# Patient Record
Sex: FEMALE | Race: WHITE | NOT HISPANIC OR LATINO | Employment: FULL TIME | URBAN - METROPOLITAN AREA
[De-identification: names, ages, dates, MRNs, and addresses within clinical notes are randomized per-mention and may not be internally consistent; named-entity substitution may affect disease eponyms.]

---

## 2017-05-05 ENCOUNTER — TRANSCRIBE ORDERS (OUTPATIENT)
Dept: ADMINISTRATIVE | Facility: HOSPITAL | Age: 42
End: 2017-05-05

## 2017-05-05 DIAGNOSIS — Z12.31 VISIT FOR SCREENING MAMMOGRAM: Primary | ICD-10-CM

## 2017-06-09 ENCOUNTER — HOSPITAL ENCOUNTER (OUTPATIENT)
Dept: MAMMOGRAPHY | Facility: HOSPITAL | Age: 42
Discharge: HOME | End: 2017-06-09
Attending: OBSTETRICS & GYNECOLOGY | Admitting: OBSTETRICS & GYNECOLOGY

## 2017-06-09 DIAGNOSIS — Z12.31 VISIT FOR SCREENING MAMMOGRAM: ICD-10-CM

## 2017-06-09 PROCEDURE — 77063 BREAST TOMOSYNTHESIS BI: CPT

## 2017-06-09 PROCEDURE — 77067 SCR MAMMO BI INCL CAD: CPT | Performed by: RADIOLOGY

## 2017-06-09 PROCEDURE — G0202 SCR MAMMO BI INCL CAD: HCPCS

## 2017-06-09 PROCEDURE — 77063 BREAST TOMOSYNTHESIS BI: CPT | Performed by: RADIOLOGY

## 2018-07-20 ENCOUNTER — TRANSCRIBE ORDERS (OUTPATIENT)
Dept: ADMINISTRATIVE | Facility: HOSPITAL | Age: 43
End: 2018-07-20

## 2018-07-20 DIAGNOSIS — Z12.31 VISIT FOR SCREENING MAMMOGRAM: Primary | ICD-10-CM

## 2018-08-24 ENCOUNTER — APPOINTMENT (OUTPATIENT)
Dept: MAMMOGRAPHY | Facility: HOSPITAL | Age: 43
End: 2018-08-24
Attending: OBSTETRICS & GYNECOLOGY

## 2018-10-02 ENCOUNTER — HOSPITAL ENCOUNTER (OUTPATIENT)
Dept: MAMMOGRAPHY | Facility: HOSPITAL | Age: 43
Discharge: HOME | End: 2018-10-02
Attending: OBSTETRICS & GYNECOLOGY | Admitting: OBSTETRICS & GYNECOLOGY

## 2018-10-02 DIAGNOSIS — Z12.31 VISIT FOR SCREENING MAMMOGRAM: ICD-10-CM

## 2018-10-02 PROCEDURE — 77063 BREAST TOMOSYNTHESIS BI: CPT | Performed by: RADIOLOGY

## 2018-10-02 PROCEDURE — 77063 BREAST TOMOSYNTHESIS BI: CPT

## 2018-10-02 PROCEDURE — 77067 SCR MAMMO BI INCL CAD: CPT | Performed by: RADIOLOGY

## 2018-10-02 PROCEDURE — 77067 SCR MAMMO BI INCL CAD: CPT

## 2018-10-18 ENCOUNTER — HOSPITAL ENCOUNTER (OUTPATIENT)
Dept: MAMMOGRAPHY | Facility: HOSPITAL | Age: 43
Discharge: HOME | End: 2018-10-18
Admitting: RADIOLOGY

## 2018-10-18 DIAGNOSIS — R92.8 ABNORMAL MAMMOGRAM: ICD-10-CM

## 2018-10-18 PROCEDURE — 77065 DX MAMMO INCL CAD UNI: CPT

## 2018-10-18 PROCEDURE — G0279 TOMOSYNTHESIS, MAMMO: HCPCS

## 2018-10-18 PROCEDURE — 77061 BREAST TOMOSYNTHESIS UNI: CPT | Performed by: RADIOLOGY

## 2018-10-18 PROCEDURE — 77065 DX MAMMO INCL CAD UNI: CPT | Performed by: RADIOLOGY

## 2019-10-23 ENCOUNTER — TRANSCRIBE ORDERS (OUTPATIENT)
Dept: ADMINISTRATIVE | Facility: HOSPITAL | Age: 44
End: 2019-10-23

## 2019-10-23 DIAGNOSIS — Z12.31 VISIT FOR SCREENING MAMMOGRAM: Primary | ICD-10-CM

## 2020-04-21 ENCOUNTER — APPOINTMENT (OUTPATIENT)
Dept: MAMMOGRAPHY | Facility: HOSPITAL | Age: 45
End: 2020-04-21

## 2020-07-09 ENCOUNTER — APPOINTMENT (OUTPATIENT)
Dept: MAMMOGRAPHY | Facility: HOSPITAL | Age: 45
End: 2020-07-09

## 2020-10-16 ENCOUNTER — HOSPITAL ENCOUNTER (OUTPATIENT)
Dept: MAMMOGRAPHY | Facility: HOSPITAL | Age: 45
Discharge: HOME | End: 2020-10-16
Admitting: OBSTETRICS & GYNECOLOGY

## 2020-10-16 DIAGNOSIS — Z12.31 VISIT FOR SCREENING MAMMOGRAM: ICD-10-CM

## 2020-10-16 PROCEDURE — 77067 SCR MAMMO BI INCL CAD: CPT

## 2020-10-16 PROCEDURE — 77067 SCR MAMMO BI INCL CAD: CPT | Performed by: RADIOLOGY

## 2020-10-16 PROCEDURE — 77063 BREAST TOMOSYNTHESIS BI: CPT | Performed by: RADIOLOGY

## 2020-10-16 PROCEDURE — 77063 BREAST TOMOSYNTHESIS BI: CPT

## 2020-12-16 ENCOUNTER — OFFICE VISIT (OUTPATIENT)
Dept: OBSTETRICS AND GYNECOLOGY | Facility: CLINIC | Age: 45
End: 2020-12-16

## 2020-12-16 VITALS
BODY MASS INDEX: 30.3 KG/M2 | HEIGHT: 63 IN | SYSTOLIC BLOOD PRESSURE: 118 MMHG | DIASTOLIC BLOOD PRESSURE: 76 MMHG | WEIGHT: 171 LBS

## 2020-12-16 DIAGNOSIS — R23.2 HOT FLASHES: ICD-10-CM

## 2020-12-16 DIAGNOSIS — Z01.419 WOMEN'S ANNUAL ROUTINE GYNECOLOGICAL EXAMINATION: Primary | ICD-10-CM

## 2020-12-16 DIAGNOSIS — Z12.39 ENCOUNTER FOR BREAST CANCER SCREENING USING NON-MAMMOGRAM MODALITY: ICD-10-CM

## 2020-12-16 DIAGNOSIS — Z30.431 ENCOUNTER FOR ROUTINE CHECKING OF INTRAUTERINE CONTRACEPTIVE DEVICE (IUD): ICD-10-CM

## 2020-12-16 PROCEDURE — 99396 PREV VISIT EST AGE 40-64: CPT | Performed by: OBSTETRICS & GYNECOLOGY

## 2020-12-16 RX ORDER — SPIRONOLACTONE 25 MG/1
TABLET ORAL
COMMUNITY

## 2020-12-16 RX ORDER — OLOPATADINE HYDROCHLORIDE 2 MG/ML
SOLUTION/ DROPS OPHTHALMIC
COMMUNITY
Start: 2020-11-09

## 2020-12-16 RX ORDER — FUROSEMIDE 20 MG/1
TABLET ORAL
COMMUNITY
Start: 2020-11-09

## 2020-12-16 RX ORDER — LIFITEGRAST 50 MG/ML
SOLUTION/ DROPS OPHTHALMIC
COMMUNITY
Start: 2020-11-11

## 2020-12-16 RX ORDER — DICLOFENAC SODIUM 75 MG/1
TABLET, DELAYED RELEASE ORAL
COMMUNITY
Start: 2020-12-02

## 2020-12-16 RX ORDER — RIZATRIPTAN BENZOATE 10 MG/1
TABLET, ORALLY DISINTEGRATING ORAL
COMMUNITY
Start: 2020-11-11

## 2020-12-16 RX ORDER — PLECANATIDE 3 MG/1
TABLET ORAL
COMMUNITY
Start: 2020-12-02

## 2020-12-16 RX ORDER — PHENTERMINE HYDROCHLORIDE 37.5 MG/1
37.5 CAPSULE ORAL DAILY
COMMUNITY
Start: 2020-11-09 | End: 2023-03-23

## 2020-12-16 RX ORDER — PANTOPRAZOLE SODIUM 40 MG/1
TABLET, DELAYED RELEASE ORAL
COMMUNITY
Start: 2020-12-01

## 2020-12-16 RX ORDER — ALPRAZOLAM 0.5 MG/1
TABLET ORAL
COMMUNITY
Start: 2020-11-09

## 2020-12-16 RX ORDER — VALACYCLOVIR HYDROCHLORIDE 500 MG/1
TABLET, FILM COATED ORAL
COMMUNITY
Start: 2020-11-09

## 2020-12-16 RX ORDER — AZATHIOPRINE 50 MG/1
TABLET ORAL
COMMUNITY
Start: 2020-11-10

## 2020-12-16 RX ORDER — BUPROPION HYDROCHLORIDE 150 MG/1
TABLET, EXTENDED RELEASE ORAL
COMMUNITY
Start: 2020-10-13

## 2020-12-16 RX ORDER — VENLAFAXINE HYDROCHLORIDE 37.5 MG/1
CAPSULE, EXTENDED RELEASE ORAL
COMMUNITY
Start: 2020-11-23

## 2020-12-16 NOTE — PROGRESS NOTES
GYN Annual Exam     CC - Here for annual exam.     Subjective   HPI  Jazmine Rubalcava is a 45 y.o. female, , who presents for annual well woman exam. No LMP recorded. Patient has had an implant..  Periods are absent secondary to birth control, lasting 0 days.  Dysmenorrhea:none.  Patient reports problems with: none.  Partner Status: Marital Status: .  New Partners since last visit: no.  Desires STD Screening: no.  Patient has not had the hpv vaccine.    Patient asking to discuss nexplanon.     Additional OB/GYN History     Current contraception: contraceptive methods: IUD.  Insertion date: 2016  Desires to: continue contraception  Last Pap :   Last Completed Pap Smear       Status Date      PAP SMEAR Done 8/15/2019 negative        History of abnormal Pap smear: yes - h/o LEEP  Family history of uterine, colon, breast, or ovarian cancer: yes - colon CA in her P great uncle  Previous Mammogram :  yes - 10/2019  Performs monthly Self-Breast Exam: yes  Exercises Regularly:no  Feelings of Anxiety or Depression: no  Tobacco Usage?: No   OB History        1    Para        Term   0            AB   1    Living           SAB        TAB        Ectopic        Molar        Multiple        Live Births                    Health Maintenance   Topic Date Due   • Annual Gynecologic Pelvic and Breast Exam  1974   • ANNUAL PHYSICAL  03/10/1977   • TDAP/TD VACCINES (1 - Tdap) 03/10/1993   • INFLUENZA VACCINE  2020   • HEPATITIS C SCREENING  2020   • MAMMOGRAM  10/16/2021   • PAP SMEAR  08/15/2022   • COLONOSCOPY  2029   • Pneumococcal Vaccine 0-64  Aged Out           The additional following portions of the patient's history were reviewed and updated as appropriate: allergies, current medications, past family history, past medical history, past social history and past surgical history.    Review of Systems   Constitutional: Negative.    HENT: Negative.    Respiratory: Negative.   "  Cardiovascular: Negative.    Gastrointestinal: Negative.    Endocrine: Positive for heat intolerance.   Genitourinary: Negative.    Musculoskeletal: Negative.    Skin: Negative.    Allergic/Immunologic: Negative.    Neurological: Negative.    Hematological: Negative.    Psychiatric/Behavioral: Negative.        I have reviewed and agree with the HPI, ROS, and historical information as entered above. Annamarie Luna MD    Objective   /76   Ht 158.8 cm (62.5\")   Wt 77.6 kg (171 lb)   Breastfeeding No   BMI 30.78 kg/m²     Physical Exam  Vitals signs and nursing note reviewed. Exam conducted with a chaperone present.   Constitutional:       Appearance: She is well-developed.   HENT:      Head: Normocephalic and atraumatic.   Neck:      Musculoskeletal: Normal range of motion. No muscular tenderness.      Thyroid: No thyroid mass or thyromegaly.   Cardiovascular:      Rate and Rhythm: Normal rate and regular rhythm.      Heart sounds: No murmur.   Pulmonary:      Effort: Pulmonary effort is normal. No retractions.      Breath sounds: Normal breath sounds. No wheezing, rhonchi or rales.   Chest:      Chest wall: No mass or tenderness.      Breasts:         Right: Normal. No mass, nipple discharge, skin change or tenderness.         Left: Normal. No mass, nipple discharge, skin change or tenderness.   Abdominal:      General: Bowel sounds are normal.      Palpations: Abdomen is soft. Abdomen is not rigid. There is no mass.      Tenderness: There is no abdominal tenderness. There is no guarding.      Hernia: No hernia is present. There is no hernia in the left inguinal area.   Genitourinary:     Labia:         Right: No rash, tenderness or lesion.         Left: No rash, tenderness or lesion.       Vagina: Normal. No vaginal discharge or lesions.      Cervix: No cervical motion tenderness, discharge, lesion or cervical bleeding.      Uterus: Normal. Not enlarged, not fixed and not tender.       Adnexa:         " Right: No mass or tenderness.          Left: No mass or tenderness.        Rectum: No external hemorrhoid.      Comments: Strings present  Neurological:      Mental Status: She is alert and oriented to person, place, and time.   Psychiatric:         Behavior: Behavior normal.         Assessment/Plan       Encounter Diagnoses   Name Primary?   • Women's annual routine gynecological examination Yes   • Encounter for routine checking of intrauterine contraceptive device (IUD)    • Encounter for breast cancer screening using non-mammogram modality    • Hot flashes        Plan     1. Recommended use of Vitamin D replacement and getting adequate calcium in her diet. (1500mg)  2. Reviewed monthly self breast exams.  Instructed to call with lumps, pain, or breast discharge.    3. Continue yearly mammography  4. Reviewed options for hot flashes.  SHe has recently started Effexor.  Encouraged to try Estroven as well.  5. Reviewed HPV guidelines.  6. Reviewed exercise as a preventative health measures.       Annamarie Luna MD  12/16/2020

## 2021-09-14 ENCOUNTER — TELEPHONE (OUTPATIENT)
Dept: OBSTETRICS AND GYNECOLOGY | Facility: CLINIC | Age: 46
End: 2021-09-14

## 2021-09-14 ENCOUNTER — OFFICE VISIT (OUTPATIENT)
Dept: OBSTETRICS AND GYNECOLOGY | Facility: CLINIC | Age: 46
End: 2021-09-14

## 2021-09-14 VITALS
BODY MASS INDEX: 29.45 KG/M2 | WEIGHT: 166.2 LBS | HEIGHT: 63 IN | DIASTOLIC BLOOD PRESSURE: 70 MMHG | SYSTOLIC BLOOD PRESSURE: 114 MMHG

## 2021-09-14 DIAGNOSIS — R30.0 DYSURIA: Primary | ICD-10-CM

## 2021-09-14 DIAGNOSIS — R39.15 URINARY URGENCY: ICD-10-CM

## 2021-09-14 DIAGNOSIS — Z30.431 IUD CHECK UP: ICD-10-CM

## 2021-09-14 DIAGNOSIS — Z20.2 EXPOSURE TO STD: ICD-10-CM

## 2021-09-14 LAB
BILIRUB BLD-MCNC: NEGATIVE MG/DL
CLARITY, POC: CLEAR
COLOR UR: YELLOW
GLUCOSE UR STRIP-MCNC: NEGATIVE MG/DL
KETONES UR QL: ABNORMAL
LEUKOCYTE EST, POC: ABNORMAL
NITRITE UR-MCNC: NEGATIVE MG/ML
PH UR: 6 [PH] (ref 5–8)
PROT UR STRIP-MCNC: NEGATIVE MG/DL
RBC # UR STRIP: ABNORMAL /UL
SP GR UR: 1.01 (ref 1–1.03)
UROBILINOGEN UR QL: NORMAL

## 2021-09-14 PROCEDURE — 99213 OFFICE O/P EST LOW 20 MIN: CPT | Performed by: OBSTETRICS & GYNECOLOGY

## 2021-09-14 PROCEDURE — 81003 URINALYSIS AUTO W/O SCOPE: CPT | Performed by: OBSTETRICS & GYNECOLOGY

## 2021-09-14 RX ORDER — SULFAMETHOXAZOLE AND TRIMETHOPRIM 800; 160 MG/1; MG/1
1 TABLET ORAL 2 TIMES DAILY
Qty: 6 TABLET | Refills: 0 | Status: SHIPPED | OUTPATIENT
Start: 2021-09-14 | End: 2023-03-23

## 2021-09-14 NOTE — TELEPHONE ENCOUNTER
Patient lvm asking if diflucan could be called in with her antibiotics today because it will cause a yeast infection

## 2021-09-14 NOTE — PROGRESS NOTES
"     Gynecologic Note        Subjective   HPI  Jazmine Rubalcava is a 46 y.o. female, , who presents for IUD string trim.  She had an IUD placed 5.5 years ago.  Her LMP was No LMP recorded (lmp unknown). Patient is premenopausal. Since the IUD placement, the patient reports new partner having issue with string length. Additional complaints include nothing.    Also reporting symptoms of dysuria and urinary urgency.    The additional following portions of the patient's history were reviewed and updated as appropriate: allergies, current medications, past family history, past medical history, past social history, past surgical history and problem list.      Did the patient have u/s today? No.    Review of Systems   Genitourinary: Positive for dysuria and urgency.     All other systems reviewed and are negative.     I have reviewed and agree with the HPI, ROS, and historical information as entered above. Annamarie Luna MD    Past Medical History:   Diagnosis Date   • Anemia    • History of abnormal cervical Papanicolaou smear    • IBS (irritable bowel syndrome)    • IUD (intrauterine device) in place     Mirena inserted 3/15/2016   • Lupus (CMS/HCC)    • Ovarian cyst     serous   • Rosacea    • Weight gain        Past Surgical History:   Procedure Laterality Date   • LASIK     • LEEP     • OTHER SURGICAL HISTORY      EAB--   • RHINOPLASTY         Objective   /70 (BP Location: Right arm, Patient Position: Sitting, Cuff Size: Adult)   Ht 158.8 cm (62.5\")   Wt 75.4 kg (166 lb 3.2 oz)   LMP  (LMP Unknown) Comment: Mirena IUD in place  BMI 29.91 kg/m²     Physical Exam  Vitals and nursing note reviewed. Exam conducted with a chaperone present.   Constitutional:       Appearance: She is well-developed.   HENT:      Head: Normocephalic and atraumatic.   Neck:      Thyroid: No thyroid mass or thyromegaly.   Pulmonary:      Breath sounds: No rhonchi.   Abdominal:      Palpations: Abdomen is soft. Abdomen is not " rigid. There is no mass.      Tenderness: There is no abdominal tenderness. There is no guarding.      Hernia: No hernia is present.   Genitourinary:     Vagina: Normal.      Cervix: Normal.      Uterus: Normal.       Comments: IUD strings trimmed very short  Musculoskeletal:      Cervical back: Normal range of motion. No muscular tenderness.   Neurological:      Mental Status: She is alert and oriented to person, place, and time.   Psychiatric:         Behavior: Behavior normal.         Assessment/Plan     Assessment     Problem List Items Addressed This Visit     None      Visit Diagnoses     Dysuria    -  Primary    Relevant Orders    POC Urinalysis Dipstick, Automated (Completed)    Urine Culture - Urine, Urine, Clean Catch    Chlamydia trachomatis, Neisseria gonorrhoeae, PCR w/ confirmation - Urine, Urine, Clean Catch    Urinary urgency        Relevant Orders    POC Urinalysis Dipstick, Automated (Completed)    Urine Culture - Urine, Urine, Clean Catch    Chlamydia trachomatis, Neisseria gonorrhoeae, PCR w/ confirmation - Urine, Urine, Clean Catch    IUD check up        Exposure to STD              Plan     1. IUD strings trimmed.   2. Suspected UTI.  Rx sent in as well as STD cultures sent with urine culture.  3. STD cultures- added to urine cx.  New partner.        Annamarie Luna MD  09/14/2021

## 2021-09-16 LAB
BACTERIA UR CULT: NORMAL
BACTERIA UR CULT: NORMAL

## 2023-03-23 ENCOUNTER — OFFICE VISIT (OUTPATIENT)
Dept: OBSTETRICS AND GYNECOLOGY | Facility: CLINIC | Age: 48
End: 2023-03-23
Payer: COMMERCIAL

## 2023-03-23 VITALS
BODY MASS INDEX: 22.5 KG/M2 | SYSTOLIC BLOOD PRESSURE: 100 MMHG | HEIGHT: 63 IN | WEIGHT: 127 LBS | DIASTOLIC BLOOD PRESSURE: 64 MMHG

## 2023-03-23 DIAGNOSIS — Z01.419 WOMEN'S ANNUAL ROUTINE GYNECOLOGICAL EXAMINATION: Primary | ICD-10-CM

## 2023-03-23 DIAGNOSIS — Z30.431 IUD CHECK UP: ICD-10-CM

## 2023-03-23 DIAGNOSIS — Z12.31 BREAST CANCER SCREENING BY MAMMOGRAM: ICD-10-CM

## 2023-03-23 RX ORDER — TIRZEPATIDE 7.5 MG/.5ML
INJECTION, SOLUTION SUBCUTANEOUS
COMMUNITY
Start: 2023-02-20

## 2023-03-23 NOTE — PROGRESS NOTES
Gynecologic Annual Exam Note          GYN Annual Exam     Annual        Subjective     HPI  Jazmine Rubalcava is a 48 y.o. female, , who presents for annual well woman exam as a established patient .  Patient reports problems with: none.  Her periods are absent, secondary to Mirena. She reports dysmenorrhea is none. Partner Status: Marital Status: . She is is not currently sexually active. STD testing recommendations have been explained to the patient and she does desire STD testing. There were no changes to her medical or surgical history since her last visit..       Additional OB/GYN History   Current contraception: contraceptive methods: IUD.  Insertion date: 3/15/2016- mirena  Desires to: discuss contraception    Last Pap : 8/15/2019. Result: negative. HPV: negative.   Last Completed Pap Smear     This patient has no relevant Health Maintenance data.        History of abnormal Pap smear: yes - h/o LEEP  Family history of uterine, colon, breast, or ovarian cancer: yes - Colon- Paternal Great Uncle  Performs monthly Self-Breast Exam: no  Last mammogram: 10/16/2020. Done at Mammogram.    Last Completed Mammogram     This patient has no relevant Health Maintenance data.          History of abnormal mammogram: yes- f/u imaging    Colonoscopy: has had a colonoscopy 3-4 year(s) ago.  Exercises Regularly: no  Feelings of Anxiety or Depression: no  Tobacco Usage?: No       Current Outpatient Medications:   •  ALPRAZolam (XANAX) 0.5 MG tablet, , Disp: , Rfl:   •  azaTHIOprine (IMURAN) 50 MG tablet, , Disp: , Rfl:   •  Belimumab (BENLYSTA IV), Infuse  into a venous catheter., Disp: , Rfl:   •  buPROPion SR (WELLBUTRIN SR) 150 MG 12 hr tablet, , Disp: , Rfl:   •  diclofenac (VOLTAREN) 75 MG EC tablet, , Disp: , Rfl:   •  furosemide (LASIX) 20 MG tablet, , Disp: , Rfl:   •  Mounjaro 7.5 MG/0.5ML solution pen-injector, , Disp: , Rfl:   •  pantoprazole (PROTONIX) 40 MG EC tablet, , Disp: , Rfl:   •   rizatriptan MLT (MAXALT-MLT) 10 MG disintegrating tablet, , Disp: , Rfl:   •  spironolactone (ALDACTONE) 25 MG tablet, , Disp: , Rfl:   •  Trulance 3 MG tablet, , Disp: , Rfl:   •  venlafaxine XR (EFFEXOR-XR) 37.5 MG 24 hr capsule, , Disp: , Rfl:   •  Xiidra 5 % ophthalmic solution, , Disp: , Rfl:   •  olopatadine (PATADAY) 0.2 % solution ophthalmic solution, , Disp: , Rfl:   •  valACYclovir (VALTREX) 500 MG tablet, , Disp: , Rfl:      Patient denies the need for medication refills today.    OB History        1    Para   0    Term   0       0    AB   1    Living   0       SAB   0    IAB   0    Ectopic   0    Molar   0    Multiple   0    Live Births   0                Past Medical History:   Diagnosis Date   • Abnormal Pap smear of cervix    • Anemia    • Anxiety    • Depression    • History of abnormal cervical Papanicolaou smear    • HPV (human papilloma virus) infection    • IBS (irritable bowel syndrome)    • IUD (intrauterine device) in place     Mirena inserted 3/15/2016   • Lupus (HCC)    • Migraine    • Ovarian cyst     serous   • PMS (premenstrual syndrome)    • Rh incompatibility    • Rosacea    • Urinary tract infection    • Urogenital trichomoniasis    • Varicella    • Weight gain         Past Surgical History:   Procedure Laterality Date   • CERVICAL BIOPSY  W/ LOOP ELECTRODE EXCISION     • LASIK     • LEEP     • OTHER SURGICAL HISTORY      EAB--   • RHINOPLASTY     • WISDOM TOOTH EXTRACTION         Health Maintenance   Topic Date Due   • COVID-19 Vaccine (1) Never done   • Pneumococcal Vaccine 0-64 (1 - PCV) Never done   • TDAP/TD VACCINES (1 - Tdap) Never done   • HEPATITIS C SCREENING  Never done   • ANNUAL PHYSICAL  Never done   • MAMMOGRAM  10/16/2021   • Annual Gynecologic Pelvic and Breast Exam  2021   • INFLUENZA VACCINE  Never done   • PAP SMEAR  08/15/2022   • COLORECTAL CANCER SCREENING  2029       The additional following portions of the patient's history were  "reviewed and updated as appropriate: allergies, current medications, past family history, past medical history, past social history, past surgical history and problem list.    Review of Systems      I have reviewed and agree with the HPI, ROS, and historical information as entered above. Carla Poe, APRN      Objective   /64   Ht 158.8 cm (62.5\")   Wt 57.6 kg (127 lb)   LMP  (LMP Unknown)   BMI 22.86 kg/m²     Physical Exam  Vitals and nursing note reviewed. Exam conducted with a chaperone present.   Constitutional:       Appearance: Normal appearance. She is well-developed.   HENT:      Head: Normocephalic and atraumatic.   Neck:      Thyroid: No thyroid mass or thyromegaly.   Pulmonary:      Effort: Pulmonary effort is normal. No retractions.      Breath sounds: Normal breath sounds. No wheezing, rhonchi or rales.   Chest:      Chest wall: No mass or tenderness.   Breasts:     Right: Normal. No mass, nipple discharge, skin change or tenderness.      Left: Normal. No mass, nipple discharge, skin change or tenderness.   Abdominal:      General: Bowel sounds are normal.      Palpations: Abdomen is soft. Abdomen is not rigid. There is no mass.      Tenderness: There is no abdominal tenderness. There is no guarding.      Hernia: No hernia is present. There is no hernia in the left inguinal area.   Genitourinary:     General: Normal vulva.      Labia:         Right: No rash, tenderness or lesion.         Left: No rash, tenderness or lesion.       Vagina: Normal. No vaginal discharge or lesions.      Cervix: Normal.      Uterus: Normal. Not enlarged, not fixed and not tender.       Adnexa: Right adnexa normal and left adnexa normal.        Right: No mass or tenderness.          Left: No mass or tenderness.        Rectum: No external hemorrhoid.      Comments: IUD strings present  Musculoskeletal:      Cervical back: Normal range of motion. No muscular tenderness.   Skin:     General: Skin is warm and dry. "   Neurological:      Mental Status: She is alert and oriented to person, place, and time.   Psychiatric:         Mood and Affect: Mood normal.         Behavior: Behavior normal.            Assessment and Plan    Problem List Items Addressed This Visit    None  Visit Diagnoses     Women's annual routine gynecological examination    -  Primary    Relevant Orders    LIQUID-BASED PAP SMEAR WITH HPV GENOTYPING REGARDLESS OF INTERPRETATION (SILVER,COR,MAD)    IUD check up        Breast cancer screening by mammogram        Relevant Orders    Mammo Screening Digital Tomosynthesis Bilateral With CAD          1. GYN annual well woman exam.   2. Pap guidelines reviewed.  3. Reviewed monthly self breast exams.  Instructed to call with lumps, pain, or breast discharge.    4. Ordered Mammogram today  5. Recommended use of Vitamin D replacement and getting adequate calcium in her diet. (1500mg)  6. Reviewed exercise as a preventative health measures.   7. Reccommended Flu Vaccine in Fall of each year.  8. Symptoms of menopausal transition reviewed with patient.   9. RTC in 1 year or PRN with problems.  10. Discussed importance of routine colon cancer screening. Reviewed current guidelines. Recommended colonoscopy after age 45.  11. Return in about 1 year (around 3/23/2024) for Annual physical.   12. Mirena expires in one year.  She will likely get it switched out at next annual.    Carla Poe, APRN  03/23/2023

## 2023-03-29 LAB — REF LAB TEST METHOD: NORMAL

## 2023-05-09 ENCOUNTER — OFFICE VISIT (OUTPATIENT)
Dept: OBSTETRICS AND GYNECOLOGY | Facility: CLINIC | Age: 48
End: 2023-05-09
Payer: COMMERCIAL

## 2023-05-09 VITALS
WEIGHT: 122 LBS | HEIGHT: 63 IN | DIASTOLIC BLOOD PRESSURE: 68 MMHG | SYSTOLIC BLOOD PRESSURE: 102 MMHG | BODY MASS INDEX: 21.62 KG/M2

## 2023-05-09 DIAGNOSIS — Z76.89 ENCOUNTER FOR BIOPSY: Primary | ICD-10-CM

## 2023-05-09 DIAGNOSIS — Z01.89 NORMAL COLPOSCOPY: Primary | ICD-10-CM

## 2023-05-09 DIAGNOSIS — R87.612 LGSIL ON PAP SMEAR OF CERVIX: ICD-10-CM

## 2023-05-09 LAB
B-HCG UR QL: NEGATIVE
EXPIRATION DATE: NORMAL
INTERNAL NEGATIVE CONTROL: NORMAL
INTERNAL POSITIVE CONTROL: NORMAL
Lab: NORMAL

## 2023-05-09 RX ORDER — TIRZEPATIDE 5 MG/.5ML
INJECTION, SOLUTION SUBCUTANEOUS
COMMUNITY

## 2023-05-09 RX ORDER — ERGOCALCIFEROL 1.25 MG/1
CAPSULE ORAL
COMMUNITY
Start: 2023-04-03

## 2023-05-09 RX ORDER — ATORVASTATIN CALCIUM 10 MG/1
10 TABLET, FILM COATED ORAL DAILY
COMMUNITY

## 2023-05-09 NOTE — PROGRESS NOTES
Colposcopy Procedure Note        Procedures    Indications: Jazmine Rubalcava is a 48 y.o. female, , whose No LMP recorded. Patient has had an implant. 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 Prior cervical treatment includes: LEEP in  and Colposcopy in 2015. She understands the need for the procedure and is aware of the complications, including post-colposcopic vaginal bleeding, vaginal leukorrhea or cervicitis.  She is aware she may experience discomfort.  After being presented with the risk, benefits, and alternatives the patient wished to proceed.      Urine pregnancy test done in the office today was Negative.      The patient has not had Gardasil. She is not a smoker.      Procedure Details   The risks and benefits of the procedure and Verbal informed consent obtained.    She was positioned in the dorsal lithotomy position and a speculum was inserted into the vagina and excellent visualization of cervix achieved, cervix swabbed x 3 with acetic acid solution. The transformation zone was completely visualized.  A cervical biopsy was obtained at 7 o'clock.  An endocervical curettage was performed.  This colposcopy was satisfactory.     Findings:  The procedure was notable for:  Physical Exam  Constitutional:       Appearance: She is well-developed.   HENT:      Head: Normocephalic.   Eyes:      Conjunctiva/sclera: Conjunctivae normal.   Pulmonary:      Effort: Pulmonary effort is normal.   Psychiatric:         Behavior: Behavior normal.         Specimens:   Specimens labelled and sent to Pathology.    Complications: none.    The patient tolerated the procedure very well and with mild discomfort and bleeding.    Assessment and Plan    Problem List Items Addressed This Visit    None  Visit Diagnoses     Encounter for biopsy    -  Primary    Relevant Orders    POC  Pregnancy, Urine (Completed)    LGSIL on Pap smear of cervix              1. Will base further treatment on Pathology findings.  2. Treatment options discussed with patient.  3. Post biopsy instructions given to patient.      Annamarie Luna MD  05/09/2023

## 2023-05-11 LAB — REF LAB TEST METHOD: NORMAL

## 2023-12-21 ENCOUNTER — OFFICE VISIT (OUTPATIENT)
Dept: OBSTETRICS AND GYNECOLOGY | Facility: CLINIC | Age: 48
End: 2023-12-21
Payer: COMMERCIAL

## 2023-12-21 VITALS
SYSTOLIC BLOOD PRESSURE: 112 MMHG | HEIGHT: 63 IN | WEIGHT: 116 LBS | BODY MASS INDEX: 20.55 KG/M2 | DIASTOLIC BLOOD PRESSURE: 70 MMHG

## 2023-12-21 DIAGNOSIS — N89.8 VAGINAL DISCHARGE: Primary | ICD-10-CM

## 2023-12-21 LAB
KOH PREP NAIL: NORMAL
WET PREP GENITAL: NEGATIVE

## 2023-12-21 PROCEDURE — 99213 OFFICE O/P EST LOW 20 MIN: CPT | Performed by: NURSE PRACTITIONER

## 2023-12-21 PROCEDURE — 87210 SMEAR WET MOUNT SALINE/INK: CPT | Performed by: NURSE PRACTITIONER

## 2023-12-21 PROCEDURE — 87220 TISSUE EXAM FOR FUNGI: CPT | Performed by: NURSE PRACTITIONER

## 2023-12-21 NOTE — PROGRESS NOTES
Chief Complaint   Patient presents with    Vaginal Discharge         Subjective   HPI  Jazmine Rubalcava is a 48 y.o. female, , who presents for evaluation of white discharge that has turned to greenish. No itching, burning, or other complaints. The discharge is green, white, and thick.  Her symptoms have been present for 1 month(s).  Additional she has noticed  none .    Prior to the onset of symptoms she was not on antibiotics.  She has not recently changed soaps/detergents/toilet tissue. Prior to this visit, she has used nothing in an attempt to improve her symptoms. She has been taking more baths.     Sexual History    She is not currently sexually active. In the past year there has been NO new sexual partners. Condoms are not needed because she is not sexually active.  She would like to be screened for STD's at today's exam.    Current birth control method: IUD - Mirena.    Menstrual History:    No LMP recorded. Patient has had an implant.    In the past 6 months her cycles have been absent.   Her menstrual flow has been absent. Intermenstrual bleeding is absent.  Post-coital bleeding is n/a.      Additional OB/GYN History   Last Pap : 3-23-23 LSIL, HPV +, colpo May 2023  Last Completed Pap Smear            PAP SMEAR (Every 3 Years) Next due on 3/23/2026      2023  LIQUID-BASED PAP SMEAR WITH HPV GENOTYPING REGARDLESS OF INTERPRETATION (SILVER,COR,MAD)    08/15/2019  Done - negative                    OB History          1    Para   0    Term   0       0    AB   1    Living   0         SAB   0    IAB   0    Ectopic   0    Molar   0    Multiple   0    Live Births   0                The additional following portions of the patient's history were reviewed and updated as appropriate: allergies, current medications, past family history, past medical history, past social history, past surgical history, and problem list.    Review of Systems   Constitutional: Negative.    HENT:  "Negative.     Eyes: Negative.    Respiratory: Negative.     Cardiovascular: Negative.    Gastrointestinal: Negative.    Endocrine: Negative.    Genitourinary:  Positive for vaginal discharge.   Musculoskeletal: Negative.    Skin: Negative.    Allergic/Immunologic: Negative.    Neurological: Negative.    Hematological: Negative.    Psychiatric/Behavioral: Negative.       All other systems reviewed and are negative.     I have reviewed and agree with the HPI, ROS, and historical information as entered above. Ghulam ROSENTHAL Troy, APRN      Objective   /70 (BP Location: Right arm, Patient Position: Sitting, Cuff Size: Adult)   Ht 158.8 cm (62.5\")   Wt 52.6 kg (116 lb)   Breastfeeding No   BMI 20.88 kg/m²     Physical Exam  Vitals and nursing note reviewed. Exam conducted with a chaperone present.   Constitutional:       Appearance: Normal appearance.   Genitourinary:     General: Normal vulva.      Exam position: Lithotomy position.      Labia:         Right: No rash, tenderness or lesion.         Left: No rash, tenderness or lesion.       Vagina: Normal. No lesions.      Cervix: No cervical motion tenderness, discharge, lesion or cervical bleeding.      Uterus: Normal. Not enlarged, not fixed and not tender.       Adnexa:         Right: No mass or tenderness.          Left: No mass or tenderness.        Rectum: No external hemorrhoid.      Comments: Chaperone Present. IUD strings seen. Vaginal secretions appear normal  Neurological:      Mental Status: She is alert.         Wet mount performed? Yes. Pertinent positives include: none    Assessment & Plan     Assessment and Plan    Problem List Items Addressed This Visit    None  Visit Diagnoses       Vaginal discharge    -  Primary    Relevant Orders    NuSwab BV & Candida - Swab, Vagina    POC Wet Prep (Completed)    POC KOH Prep (Completed)              NuSwab ordered  Counseling on Vaginitis provided  Return PRN  Will call results of Nuswab and treat as " indicated.        Ghulam Simmons, APRN  12/21/2023

## 2023-12-24 LAB
A VAGINAE DNA VAG QL NAA+PROBE: ABNORMAL SCORE
BVAB2 DNA VAG QL NAA+PROBE: ABNORMAL SCORE
C ALBICANS DNA VAG QL NAA+PROBE: NEGATIVE
C GLABRATA DNA VAG QL NAA+PROBE: NEGATIVE
MEGA1 DNA VAG QL NAA+PROBE: ABNORMAL SCORE

## 2023-12-26 DIAGNOSIS — B96.89 BACTERIAL VAGINITIS: Primary | ICD-10-CM

## 2023-12-26 DIAGNOSIS — N76.0 BACTERIAL VAGINITIS: Primary | ICD-10-CM

## 2023-12-26 RX ORDER — METRONIDAZOLE 500 MG/1
500 TABLET ORAL 2 TIMES DAILY
Qty: 14 TABLET | Refills: 0 | Status: SHIPPED | OUTPATIENT
Start: 2023-12-26 | End: 2024-01-02

## 2024-01-05 ENCOUNTER — TELEPHONE (OUTPATIENT)
Dept: OBSTETRICS AND GYNECOLOGY | Facility: CLINIC | Age: 49
End: 2024-01-05
Payer: COMMERCIAL

## 2024-01-05 DIAGNOSIS — N76.0 ACUTE VAGINITIS: ICD-10-CM

## 2024-01-05 DIAGNOSIS — B96.89 BACTERIAL VAGINITIS: Primary | ICD-10-CM

## 2024-01-05 DIAGNOSIS — N76.0 BACTERIAL VAGINITIS: Primary | ICD-10-CM

## 2024-01-05 RX ORDER — FLUCONAZOLE 150 MG/1
TABLET ORAL
Qty: 2 TABLET | Refills: 1 | Status: SHIPPED | OUTPATIENT
Start: 2024-01-05

## 2024-01-05 RX ORDER — CLINDAMYCIN HYDROCHLORIDE 300 MG/1
300 CAPSULE ORAL 3 TIMES DAILY
Qty: 21 CAPSULE | Refills: 0 | Status: SHIPPED | OUTPATIENT
Start: 2024-01-05 | End: 2024-01-12

## 2024-01-05 NOTE — TELEPHONE ENCOUNTER
Spoke with Alisson, reports Erna does not do well with type of BV patient has. Clindamycin sent in for patient. Diflucan also sent in case of yeast infection. Notified patient.

## 2024-01-05 NOTE — TELEPHONE ENCOUNTER
Pt w/ lupus stated that the flagyl she was prescribed is not working. She was calling in for either a new or the same meds to be called in

## 2024-01-05 NOTE — TELEPHONE ENCOUNTER
Patient positive for BV 12/21/23. Flagyl sent in 12/26/23 for 7 days. Patient reports she has Lupus and usually takes her longer to get over things. Patient reports she is still having vaginal discharge that is green in color. Denies itching or odor.

## 2024-05-15 ENCOUNTER — TELEPHONE (OUTPATIENT)
Dept: OBSTETRICS AND GYNECOLOGY | Facility: CLINIC | Age: 49
End: 2024-05-15
Payer: COMMERCIAL

## 2024-05-15 NOTE — TELEPHONE ENCOUNTER
metroNIDAZOLE (Flagyl) 500 MG tablet (12/26/2023)     fluconazole (Diflucan) 150 MG tablet (01/05/2024)     clindamycin (Cleocin) 300 MG capsule (01/05/2024)     Pt is requesting refills for vaginal discharge. She also stated that she is unable to call off of work for an appt.

## 2024-05-15 NOTE — TELEPHONE ENCOUNTER
Patient of Dr. Luna; LOV 12/21/2023.  Returned patient's call.   Reports onset of clear vaginal discharge about a week ago that has progressed to yellow and now green. States symptoms are the same as when she was dx with BV here in December.   Denies any itching, burning, odor, or using new products.   She has been taking URO vaginal probiotic for the past 2 months.   Has IUD in place.   States she has not been sexually active for past 2 years.   States she cannot take off work to come in for an appointment.  Informed her that we would need to see her. If she is not able to come in during office hours, she can go to [facility] after work. She v/u and plans to go to [facility].

## 2024-07-16 ENCOUNTER — OFFICE VISIT (OUTPATIENT)
Dept: OBSTETRICS AND GYNECOLOGY | Facility: CLINIC | Age: 49
End: 2024-07-16
Payer: COMMERCIAL

## 2024-07-16 VITALS
SYSTOLIC BLOOD PRESSURE: 96 MMHG | HEIGHT: 63 IN | BODY MASS INDEX: 2.05 KG/M2 | WEIGHT: 11.6 LBS | DIASTOLIC BLOOD PRESSURE: 58 MMHG

## 2024-07-16 DIAGNOSIS — Z12.31 BREAST CANCER SCREENING BY MAMMOGRAM: ICD-10-CM

## 2024-07-16 DIAGNOSIS — Z30.431 IUD CHECK UP: ICD-10-CM

## 2024-07-16 DIAGNOSIS — Z11.3 SCREENING EXAMINATION FOR STD (SEXUALLY TRANSMITTED DISEASE): ICD-10-CM

## 2024-07-16 DIAGNOSIS — Z01.419 PAP TEST, AS PART OF ROUTINE GYNECOLOGICAL EXAMINATION: Primary | ICD-10-CM

## 2024-07-16 DIAGNOSIS — Z01.419 ROUTINE GYNECOLOGICAL EXAMINATION: ICD-10-CM

## 2024-07-16 DIAGNOSIS — N89.8 VAGINAL DISCHARGE: ICD-10-CM

## 2024-07-16 DIAGNOSIS — N89.8 VAGINAL ODOR: ICD-10-CM

## 2024-07-16 PROCEDURE — 99396 PREV VISIT EST AGE 40-64: CPT

## 2024-07-16 NOTE — PROGRESS NOTES
Gynecologic Annual Exam Note          GYN Annual Exam     Gynecologic Exam, Vaginal Discharge, and Vaginal Odor        Subjective     HPI  Jazmine Rubalcava is a 49 y.o. female, , who presents for annual well woman exam as a established patient. There were no changes to her medical or surgical history since her last visit..  No LMP recorded (lmp unknown). Patient has had an implant.  Her periods are absent secondary to birth control.  Marital Status: . She is not currently sexually active. STD testing recommendations have been explained to the patient and she desires STD testing.    The patient would like to discuss the following complaints today: vaginal discharge and odor.    Additional OB/GYN History   contraceptive methods: IUD.  Insertion date: unknown  Desires to: continue contraception  History of migraines: yes with aura    Last Pap : 23. Result: LSIL and could not rule out HGSIL . HPV: HPV pool +.   Last Completed Pap Smear            Ordered - PAP SMEAR (Every 3 Years) Ordered on 2023  LIQUID-BASED PAP SMEAR WITH HPV GENOTYPING REGARDLESS OF INTERPRETATION ([facility])    08/15/2019  Done - negative                  History of abnormal Pap smear: yes - cin1/hpv +  Family history of uterine, colon, breast, or ovarian cancer: yes - maternal great uncle had colon cancer  Performs monthly Self-Breast Exam: no  Last mammogram: 10/16/2020.  There is a copy in the chart.    Last Completed Mammogram       This patient has no relevant Health Maintenance data.            Colonoscopy: has had a colonoscopy maybe 5 yrs ago  Exercises Regularly: no  Feelings of Anxiety or Depression: yes - on medication  Tobacco Usage?: No       Current Outpatient Medications:     ALPRAZolam (XANAX) 0.5 MG tablet, , Disp: , Rfl:     atorvastatin (LIPITOR) 10 MG tablet, Take 1 tablet by mouth Daily., Disp: , Rfl:     azaTHIOprine (IMURAN) 50 MG tablet, , Disp: , Rfl:     Belimumab  (BENLYSTA IV), Infuse  into a venous catheter., Disp: , Rfl:     buPROPion SR (WELLBUTRIN SR) 150 MG 12 hr tablet, , Disp: , Rfl:     diclofenac (VOLTAREN) 75 MG EC tablet, , Disp: , Rfl:     furosemide (LASIX) 20 MG tablet, , Disp: , Rfl:     olopatadine (PATADAY) 0.2 % solution ophthalmic solution, , Disp: , Rfl:     pantoprazole (PROTONIX) 40 MG EC tablet, , Disp: , Rfl:     rizatriptan MLT (MAXALT-MLT) 10 MG disintegrating tablet, , Disp: , Rfl:     spironolactone (ALDACTONE) 25 MG tablet, , Disp: , Rfl:     Trulance 3 MG tablet, , Disp: , Rfl:     valACYclovir (VALTREX) 500 MG tablet, As Needed., Disp: , Rfl:     venlafaxine XR (EFFEXOR-XR) 37.5 MG 24 hr capsule, , Disp: , Rfl:     Xiidra 5 % ophthalmic solution, , Disp: , Rfl:      Patient denies the need for medication refills today.    OB History          1    Para   0    Term   0       0    AB   1    Living   0         SAB   0    IAB   0    Ectopic   0    Molar   0    Multiple   0    Live Births   0                Past Medical History:   Diagnosis Date    Anemia     Anxiety     Depression     HPV (human papilloma virus) infection     IBS (irritable bowel syndrome)     Lupus     Migraine     Ovarian cyst     serous    PMS (premenstrual syndrome)     Rosacea     Urinary tract infection     Urogenital trichomoniasis     Varicella     Weight gain         Past Surgical History:   Procedure Laterality Date    CERVICAL BIOPSY  W/ LOOP ELECTRODE EXCISION      LASIK      LEEP      OTHER SURGICAL HISTORY      EAB--    RHINOPLASTY      WISDOM TOOTH EXTRACTION         Health Maintenance   Topic Date Due    BMI FOLLOWUP  Never done    COVID-19 Vaccine (1) Never done    Pneumococcal Vaccine 0-64 (1 of 2 - PCV) Never done    TDAP/TD VACCINES (1 - Tdap) Never done    ZOSTER VACCINE (1 of 2) Never done    HEPATITIS C SCREENING  Never done    ANNUAL PHYSICAL  Never done    MAMMOGRAM  10/16/2021    Annual Gynecologic Pelvic and Breast Exam  2024     "INFLUENZA VACCINE  08/01/2024    PAP SMEAR  03/23/2026    COLORECTAL CANCER SCREENING  11/01/2029       The additional following portions of the patient's history were reviewed and updated as appropriate: allergies, current medications, past family history, past medical history, past social history, past surgical history, and problem list.    Review of Systems   Constitutional: Negative.    HENT: Negative.     Eyes: Negative.    Respiratory: Negative.     Cardiovascular: Negative.    Gastrointestinal: Negative.    Endocrine: Negative.    Genitourinary:         Vaginal discharge and odor   Musculoskeletal: Negative.    Skin: Negative.    Allergic/Immunologic: Negative.    Neurological: Negative.    Hematological: Negative.    Psychiatric/Behavioral: Negative.           I have reviewed and agree with the HPI, ROS, and historical information as entered above. Lorena Cuenca, APRN          Objective   BP 96/58   Ht 160 cm (63\")   Wt 5.262 kg (11 lb 9.6 oz)   LMP  (LMP Unknown) Comment: mirena  BMI 2.05 kg/m²     Physical Exam  Vitals and nursing note reviewed. Exam conducted with a chaperone present.   Constitutional:       General: She is not in acute distress.     Appearance: Normal appearance. She is well-developed. She is not ill-appearing, toxic-appearing or diaphoretic.   HENT:      Head: Normocephalic and atraumatic.   Neck:      Thyroid: No thyroid mass or thyromegaly.   Cardiovascular:      Rate and Rhythm: Normal rate.      Heart sounds: No murmur heard.  Pulmonary:      Effort: Pulmonary effort is normal. No respiratory distress or retractions.   Chest:      Chest wall: No mass.   Breasts:     Right: Normal. No mass, nipple discharge, skin change or tenderness.      Left: Normal. No mass, nipple discharge, skin change or tenderness.   Abdominal:      General: There is no distension.      Palpations: Abdomen is soft. Abdomen is not rigid. There is no mass.      Tenderness: There is no abdominal tenderness. " There is no guarding or rebound.      Hernia: No hernia is present.   Genitourinary:     General: Normal vulva.      Exam position: Lithotomy position.      Labia:         Right: No rash, tenderness or lesion.         Left: No rash, tenderness or lesion.       Vagina: Normal. No vaginal discharge or lesions.      Cervix: Normal. No cervical motion tenderness, discharge, lesion or cervical bleeding.      Uterus: Normal. Not enlarged, not fixed and not tender.       Adnexa: Right adnexa normal and left adnexa normal.        Right: No mass or tenderness.          Left: No mass or tenderness.        Rectum: Normal. No external hemorrhoid.      Comments: Clear vaginal discharge. Iud strings visualized  Musculoskeletal:      Cervical back: Normal range of motion. No muscular tenderness.   Skin:     General: Skin is warm and dry.   Neurological:      Mental Status: She is alert and oriented to person, place, and time.   Psychiatric:         Mood and Affect: Mood normal.         Behavior: Behavior normal. Behavior is cooperative.         Thought Content: Thought content normal.         Judgment: Judgment normal.            Assessment and Plan    Problem List Items Addressed This Visit    None  Visit Diagnoses       Pap test, as part of routine gynecological examination    -  Primary    Relevant Orders    LIQUID-BASED PAP SMEAR WITH HPV GENOTYPING REGARDLESS OF INTERPRETATION ([facility])    Screening examination for STD (sexually transmitted disease)        Relevant Orders    LIQUID-BASED PAP SMEAR WITH HPV GENOTYPING REGARDLESS OF INTERPRETATION ([facility])    IUD check up        Routine gynecological examination        Vaginal odor        Relevant Orders    NuSwab VG, Candida 6sp - Swab, Cervix    Mycoplasma / Ureaplasma Culture - Swab, Cervix    Vaginal discharge        Relevant Orders    NuSwab VG, Candida 6sp - Swab, Cervix    Mycoplasma / Ureaplasma Culture - Swab, Cervix    Breast cancer screening by mammogram         Relevant Orders    Mammo Screening Digital Tomosynthesis Bilateral With CAD            GYN annual well woman exam.   Pap guidelines reviewed. Pap today with std testing. Nu swab for vaginal odor and discharge.  Encouraged use of condoms for STD prevention.  Reviewed risks/benefits of hormonal contraception after age 35, including possible increased risk of breast cancer, heart disease, blood clots and strokes.  Patient strongly desires to stay on hormonal contraception.  Reviewed monthly self breast exams.  Instructed to call with lumps, pain, or breast discharge.    Ordered Mammogram today  Reviewed exercise as a preventative health measures.   Symptoms of menopausal transition reviewed with patient.   RTC in 1 year or PRN with problems.  Discussed importance of routine colon cancer screening. Reviewed current guidelines. Recommended colonoscopy after age 45.  Follow up for IUD removal and insertion ( this year)    Lorena Cuenca, APRN  2024

## 2024-07-17 LAB — SPECIMEN STATUS: NORMAL

## 2024-07-19 LAB
A VAGINAE DNA VAG QL NAA+PROBE: ABNORMAL SCORE
BVAB2 DNA VAG QL NAA+PROBE: ABNORMAL SCORE
C ALBICANS DNA VAG QL NAA+PROBE: NEGATIVE
C GLABRATA DNA VAG QL NAA+PROBE: NEGATIVE
C KRUSEI DNA VAG QL NAA+PROBE: NEGATIVE
C LUSITANIAE DNA VAG QL NAA+PROBE: NEGATIVE
CANDIDA DNA VAG QL NAA+PROBE: NEGATIVE
MEGA1 DNA VAG QL NAA+PROBE: ABNORMAL SCORE
T VAGINALIS DNA VAG QL NAA+PROBE: NEGATIVE

## 2024-07-20 DIAGNOSIS — B96.89 BV (BACTERIAL VAGINOSIS): Primary | ICD-10-CM

## 2024-07-20 DIAGNOSIS — N76.0 BV (BACTERIAL VAGINOSIS): Primary | ICD-10-CM

## 2024-07-20 LAB
M GENITALIUM DNA SPEC QL NAA+PROBE: NEGATIVE
M HOMINIS DNA SPEC QL NAA+PROBE: NEGATIVE
UREAPLASMA DNA SPEC QL NAA+PROBE: NEGATIVE

## 2024-07-20 RX ORDER — CLINDAMYCIN PHOSPHATE 20 MG/G
1 CREAM VAGINAL NIGHTLY
Qty: 40 G | Refills: 0 | Status: SHIPPED | OUTPATIENT
Start: 2024-07-20 | End: 2024-07-27

## 2024-07-22 LAB
M HOMINIS SPEC QL CULT: NORMAL
REF LAB TEST METHOD: NORMAL
U UREALYTICUM SPEC QL CULT: NORMAL

## 2024-08-16 ENCOUNTER — TELEPHONE (OUTPATIENT)
Dept: OBSTETRICS AND GYNECOLOGY | Facility: CLINIC | Age: 49
End: 2024-08-16
Payer: COMMERCIAL

## 2024-08-16 NOTE — TELEPHONE ENCOUNTER
Pt states she was treated for BV and the symptoms went away but thinks it is coming back. She reports greenish vaginal discharge that began in the last couple of days. I scheduled her for evaluation. She VU

## 2024-08-16 NOTE — TELEPHONE ENCOUNTER
Caller: Jazmine Rubalcava    Relationship: Self    Best call back number: [phone]      Who are you requesting to speak with (clinical staff, DR. MUÑIZ      What was the call regarding: PATIENT ADVISED THAT SHE WAS IN 2/3 WKS AGO, SAW A ERNESTO, FOR YEARLY, HAVING TROUBLE WITH DISCHARGE, RECEIVED MEDICATION, BUT SYMPTOMS ARE BACK.  MAY NEED SOMETHING STRONGER, PATIENT IS ON IMMUNE SUPPRESSANTS , PLEASE ASSIST.

## 2024-08-19 ENCOUNTER — OFFICE VISIT (OUTPATIENT)
Dept: OBSTETRICS AND GYNECOLOGY | Facility: CLINIC | Age: 49
End: 2024-08-19
Payer: COMMERCIAL

## 2024-08-19 VITALS — SYSTOLIC BLOOD PRESSURE: 104 MMHG | DIASTOLIC BLOOD PRESSURE: 66 MMHG | WEIGHT: 111 LBS | BODY MASS INDEX: 19.66 KG/M2

## 2024-08-19 DIAGNOSIS — N76.0 RECURRENT VAGINITIS: Primary | ICD-10-CM

## 2024-08-19 DIAGNOSIS — N89.8 VAGINAL DISCHARGE: ICD-10-CM

## 2024-08-19 PROCEDURE — 99213 OFFICE O/P EST LOW 20 MIN: CPT

## 2024-08-19 RX ORDER — CLINDAMYCIN PHOSPHATE 20 MG/G
1 CREAM VAGINAL NIGHTLY
Qty: 40 G | Refills: 0 | Status: SHIPPED | OUTPATIENT
Start: 2024-08-19 | End: 2024-08-26

## 2024-08-19 RX ORDER — DOXYCYCLINE 100 MG/1
100 CAPSULE ORAL 2 TIMES DAILY
Qty: 14 CAPSULE | Refills: 0 | Status: SHIPPED | OUTPATIENT
Start: 2024-08-19 | End: 2024-08-26

## 2024-08-19 NOTE — PROGRESS NOTES
Chief Complaint   Patient presents with    Follow-up    Vaginitis       Subjective   HPI  Jazmine Rubalcava is a 49 y.o. female, .No LMP recorded. Patient has had an implant.. who presents for follow up on recurrent for vaginitis.      Patient states she was treated with Clindamycin cream for BV at the end of July. She reports the symptoms had gone away but thinks it is coming back. She reports greenish vaginal discharge. Denies odor. Reports mild vaginal itching. She reports she is currently on immunosuppressants for Lupus so unsure if that is causing problems. She states she has had recurrent BV since December. This is the 4th time. She was tested for Ureaplasma and mycoplasma, results were negative. She denies concern for STIs. STI results negative 24. Not sexually active since last year. Mirena inserted 8 years ago. She is scheduled to return for removal and reinsertion 9/3/2024.     Was taking a vaginal probiotic by mouth. She read online that the probiotic can cause Lactobacilli imbalance so she stopped taking.     Additional OB/GYN History     Last Pap :   Last Completed Pap Smear            PAP SMEAR (Every 3 Years) Next due on 2024  LIQUID-BASED PAP SMEAR WITH HPV GENOTYPING REGARDLESS OF INTERPRETATION ([facility])    2023  LIQUID-BASED PAP SMEAR WITH HPV GENOTYPING REGARDLESS OF INTERPRETATION ([facility])    08/15/2019  Done - negative                    Last mammogram:   Last Completed Mammogram       This patient has no relevant Health Maintenance data.              OB History          1    Para   0    Term   0       0    AB   1    Living   0         SAB   0    IAB   0    Ectopic   0    Molar   0    Multiple   0    Live Births   0                  Current Outpatient Medications:     ALPRAZolam (XANAX) 0.5 MG tablet, , Disp: , Rfl:     atorvastatin (LIPITOR) 10 MG tablet, Take 1 tablet by mouth Daily., Disp: , Rfl:     azaTHIOprine  (IMURAN) 50 MG tablet, , Disp: , Rfl:     Belimumab (BENLYSTA IV), Infuse  into a venous catheter., Disp: , Rfl:     buPROPion SR (WELLBUTRIN SR) 150 MG 12 hr tablet, , Disp: , Rfl:     diclofenac (VOLTAREN) 75 MG EC tablet, , Disp: , Rfl:     furosemide (LASIX) 20 MG tablet, , Disp: , Rfl:     olopatadine (PATADAY) 0.2 % solution ophthalmic solution, , Disp: , Rfl:     pantoprazole (PROTONIX) 40 MG EC tablet, , Disp: , Rfl:     rizatriptan MLT (MAXALT-MLT) 10 MG disintegrating tablet, , Disp: , Rfl:     spironolactone (ALDACTONE) 25 MG tablet, , Disp: , Rfl:     Trulance 3 MG tablet, , Disp: , Rfl:     valACYclovir (VALTREX) 500 MG tablet, As Needed., Disp: , Rfl:     venlafaxine XR (EFFEXOR-XR) 37.5 MG 24 hr capsule, , Disp: , Rfl:     Xiidra 5 % ophthalmic solution, , Disp: , Rfl:     clindamycin (CLEOCIN) 2 % vaginal cream, Insert 1 applicator into the vagina Every Night for 7 days., Disp: 40 g, Rfl: 0    doxycycline (MONODOX) 100 MG capsule, Take 1 capsule by mouth 2 (Two) Times a Day for 7 days., Disp: 14 capsule, Rfl: 0    terconazole (TERAZOL 7) 0.4 % vaginal cream, Insert 1 applicator into the vagina Every Night for 7 days. After completing Clindamycin cream., Disp: 45 g, Rfl: 0     Past Medical History:   Diagnosis Date    Anemia     Anxiety     Depression     HPV (human papilloma virus) infection     IBS (irritable bowel syndrome)     Lupus     Migraine     Ovarian cyst     serous    PMS (premenstrual syndrome)     Rosacea     Urinary tract infection     Urogenital trichomoniasis     Varicella     Weight gain         Past Surgical History:   Procedure Laterality Date    CERVICAL BIOPSY  W/ LOOP ELECTRODE EXCISION  2015    LASIK      LEEP  2010    OTHER SURGICAL HISTORY      EAB--    RHINOPLASTY      WISDOM TOOTH EXTRACTION         The additional following portions of the patient's history were reviewed and updated as appropriate: allergies and current medications.    Review of Systems   Respiratory:  Negative.  Negative for shortness of breath.    Cardiovascular: Negative.  Negative for chest pain.   Gastrointestinal:  Positive for constipation. Negative for abdominal distention and abdominal pain.   Genitourinary:  Positive for vaginal discharge and vaginal pain (itching). Negative for dysuria, pelvic pain, pelvic pressure, urinary incontinence and vaginal bleeding.       I have reviewed and agree with the HPI, ROS, and historical information as entered above. Scarlett Prasad, APRN      Objective   /66   Wt 50.3 kg (111 lb)   BMI 19.66 kg/m²     Physical Exam  Vitals and nursing note reviewed. Exam conducted with a chaperone present.   Constitutional:       General: She is not in acute distress.     Appearance: Normal appearance. She is not ill-appearing or toxic-appearing.   HENT:      Head: Normocephalic and atraumatic.   Pulmonary:      Effort: Pulmonary effort is normal.   Abdominal:      Palpations: Abdomen is soft. There is no mass.      Tenderness: There is no abdominal tenderness.      Hernia: No hernia is present.   Genitourinary:     General: Normal vulva.      Labia:         Right: No rash, tenderness, lesion or injury.         Left: No rash, tenderness, lesion or injury.       Vagina: No signs of injury. Vaginal discharge present. No erythema, tenderness, bleeding or lesions.      Cervix: Discharge present. No cervical motion tenderness, friability, lesion, erythema or cervical bleeding.      Uterus: Normal. Not enlarged and not tender.       Adnexa: Right adnexa normal and left adnexa normal.        Right: No mass or tenderness.          Left: No mass or tenderness.        Comments: Copious amt of creamy off white discharge noted. IUD strings not visualized.   Neurological:      Mental Status: She is alert and oriented to person, place, and time.   Psychiatric:         Mood and Affect: Mood normal.         Behavior: Behavior normal.         Assessment & Plan     Assessment     Problem List  Items Addressed This Visit    None  Visit Diagnoses       Recurrent vaginitis    -  Primary    Relevant Medications    doxycycline (MONODOX) 100 MG capsule    clindamycin (CLEOCIN) 2 % vaginal cream    terconazole (TERAZOL 7) 0.4 % vaginal cream    Other Relevant Orders    NuSwab Vaginitis (VG) - Swab, Vagina    Vaginal discharge        Relevant Orders    NuSwab Vaginitis (VG) - Swab, Vagina            Recurrent vaginitis.   Potential etiologies reviewed with patient.   Nuswab pending.   Will treat empirically due to d/c and symptoms. She will begin Doxycyline BID and Terazol cream x 7 days, then begin Terazol x 7 days.  Advised to begin vaginal moisturizers 2-3x/week after treatment. List of options provided.   IUD strings not visualized. She is scheduled to have IUD removed and replaced 9/3/24. Patient states Dr Luna cut strings short during the Coloscopy procedure. Patient informed Dr Luna will order TVUS to see if device is in place if she is unable to remove device without seeing strings.  Continue Trulance for constipation.  Vaginitis precautions reviewed.   BV education included in patient instructions.   Return in about 15 days (around 9/3/2024) for Ultrasound- IUD strings not visualized, IUD removal and insertion.        Scarlett Prasad, APRN  08/19/2024

## 2024-08-22 LAB
A VAGINAE DNA VAG QL NAA+PROBE: NORMAL SCORE
BVAB2 DNA VAG QL NAA+PROBE: NORMAL SCORE
C ALBICANS DNA VAG QL NAA+PROBE: NEGATIVE
C GLABRATA DNA VAG QL NAA+PROBE: NEGATIVE
MEGA1 DNA VAG QL NAA+PROBE: NORMAL SCORE
T VAGINALIS DNA VAG QL NAA+PROBE: NEGATIVE

## 2024-09-26 ENCOUNTER — OFFICE VISIT (OUTPATIENT)
Dept: OBSTETRICS AND GYNECOLOGY | Facility: CLINIC | Age: 49
End: 2024-09-26
Payer: COMMERCIAL

## 2024-09-26 VITALS
HEIGHT: 63 IN | WEIGHT: 112 LBS | BODY MASS INDEX: 19.84 KG/M2 | DIASTOLIC BLOOD PRESSURE: 60 MMHG | SYSTOLIC BLOOD PRESSURE: 102 MMHG

## 2024-09-26 DIAGNOSIS — N91.2 AMENORRHEA: ICD-10-CM

## 2024-09-26 DIAGNOSIS — Z30.432 ENCOUNTER FOR REMOVAL OF INTRAUTERINE CONTRACEPTIVE DEVICE (IUD): Primary | ICD-10-CM

## 2024-09-26 RX ORDER — TIRZEPATIDE 2.5 MG/.5ML
2.5 INJECTION, SOLUTION SUBCUTANEOUS WEEKLY
COMMUNITY

## 2024-09-27 LAB
ESTRADIOL SERPL-MCNC: 20.1 PG/ML
FSH SERPL-ACNC: 164 MIU/ML